# Patient Record
Sex: FEMALE | Race: ASIAN | NOT HISPANIC OR LATINO | URBAN - METROPOLITAN AREA
[De-identification: names, ages, dates, MRNs, and addresses within clinical notes are randomized per-mention and may not be internally consistent; named-entity substitution may affect disease eponyms.]

---

## 2021-02-11 ENCOUNTER — OFFICE VISIT (OUTPATIENT)
Dept: URBAN - METROPOLITAN AREA CLINIC 78 | Facility: CLINIC | Age: 51
End: 2021-02-11
Payer: COMMERCIAL

## 2021-02-11 VITALS
TEMPERATURE: 97.4 F | SYSTOLIC BLOOD PRESSURE: 116 MMHG | WEIGHT: 137 LBS | HEART RATE: 62 BPM | HEIGHT: 63 IN | DIASTOLIC BLOOD PRESSURE: 78 MMHG | RESPIRATION RATE: 16 BRPM | BODY MASS INDEX: 24.27 KG/M2

## 2021-02-11 DIAGNOSIS — R10.11 RUQ PAIN: ICD-10-CM

## 2021-02-11 DIAGNOSIS — R52 TENDERNESS DETERMINED BY EXAMINATION: ICD-10-CM

## 2021-02-11 DIAGNOSIS — R12 HEARTBURN: ICD-10-CM

## 2021-02-11 PROCEDURE — 99214 OFFICE O/P EST MOD 30 MIN: CPT | Performed by: INTERNAL MEDICINE

## 2021-02-11 PROCEDURE — G8483 FLU IMM NO ADMIN DOC REA: HCPCS | Performed by: INTERNAL MEDICINE

## 2021-02-11 RX ORDER — PANTOPRAZOLE SODIUM 40 MG/1
1 TABLET TABLET, DELAYED RELEASE ORAL ONCE A DAY
Qty: 90 | Refills: 0 | OUTPATIENT
Start: 2021-02-11

## 2021-02-11 NOTE — PHYSICAL EXAM GASTROINTESTINAL
Abdomen , soft, TTP in RUQ , TTP in epigastric area nondistended , no guarding or rigidity , no masses palpable , normal bowel sounds , Liver and Spleen , no hepatomegaly present ,liver nontender , spleen not palpable

## 2021-02-11 NOTE — HPI-TODAY'S VISIT:
The patient elicits having abdominal pain. intermmittent in RUQ and epigastric  Pain is spasm last 2 mts , no obvious food triggers  Duration of symptoms: 2 months  BM are daily  but roberto  Fiber intake ..1 capsule daily  with dinner at 6:30  Associated symptoms: Denies nausea ,vomiting  Severity/ Pain scale: Not severe  What relieves the pain: Nothing  Last colonoscopy was done in 2019 , diverticulosis otherwise normal    GERD.. couple of years  Has seen ENT for Halitosis ( sinus CT scan was fine , Tonsillectomy done )  Heartburn , sour mash  She has to sleep with three pillow at night x 1 year   Glass of wine  at 8 30  Bedtime is 10 pm  Colonoscopy performed by me in 2019 , single ascending colon diverticulum

## 2021-02-13 LAB
A/G RATIO: 1.2
ALBUMIN: 4.7
ALKALINE PHOSPHATASE: 90
ALT (SGPT): 32
AST (SGOT): 30
BILIRUBIN, TOTAL: 0.2
BUN/CREATININE RATIO: 17
BUN: 12
CALCIUM: 9.8
CARBON DIOXIDE, TOTAL: 28
CHLORIDE: 103
CREATININE: 0.7
EGFR IF AFRICN AM: 115
EGFR IF NONAFRICN AM: 100
GLOBULIN, TOTAL: 3.8
GLUCOSE: 100
LIPASE: 51
POTASSIUM: 4.6
PROTEIN, TOTAL: 8.5
SODIUM: 144

## 2021-02-17 ENCOUNTER — OFFICE VISIT (OUTPATIENT)
Dept: URBAN - METROPOLITAN AREA CLINIC 77 | Facility: CLINIC | Age: 51
End: 2021-02-17
Payer: COMMERCIAL

## 2021-02-17 DIAGNOSIS — R10.11 RUQ PAIN: ICD-10-CM

## 2021-02-17 PROCEDURE — 76700 US EXAM ABDOM COMPLETE: CPT | Performed by: INTERNAL MEDICINE

## 2021-02-17 RX ORDER — PANTOPRAZOLE SODIUM 40 MG/1
1 TABLET TABLET, DELAYED RELEASE ORAL ONCE A DAY
Qty: 90 | Refills: 0 | Status: ACTIVE | COMMUNITY
Start: 2021-02-11

## 2021-03-09 ENCOUNTER — CLAIMS CREATED FROM THE CLAIM WINDOW (OUTPATIENT)
Dept: URBAN - METROPOLITAN AREA CLINIC 4 | Facility: CLINIC | Age: 51
End: 2021-03-09
Payer: COMMERCIAL

## 2021-03-09 ENCOUNTER — OFFICE VISIT (OUTPATIENT)
Dept: URBAN - METROPOLITAN AREA SURGERY CENTER 15 | Facility: SURGERY CENTER | Age: 51
End: 2021-03-09
Payer: COMMERCIAL

## 2021-03-09 DIAGNOSIS — K21.00 GASTRO-ESOPHAGEAL REFLUX DISEASE WITH ESOPHAGITIS, WITHOUT BLEEDING: ICD-10-CM

## 2021-03-09 DIAGNOSIS — R10.11 ABDOMINAL BURNING SENSATION IN RIGHT UPPER QUADRANT: ICD-10-CM

## 2021-03-09 DIAGNOSIS — K31.89 DILATION OF STOMACH: ICD-10-CM

## 2021-03-09 DIAGNOSIS — K21.9 ACID REFLUX: ICD-10-CM

## 2021-03-09 PROCEDURE — G8907 PT DOC NO EVENTS ON DISCHARG: HCPCS | Performed by: INTERNAL MEDICINE

## 2021-03-09 PROCEDURE — 43239 EGD BIOPSY SINGLE/MULTIPLE: CPT | Performed by: INTERNAL MEDICINE

## 2021-03-09 PROCEDURE — 88305 TISSUE EXAM BY PATHOLOGIST: CPT | Performed by: PATHOLOGY

## 2021-03-09 PROCEDURE — 88312 SPECIAL STAINS GROUP 1: CPT | Performed by: PATHOLOGY

## 2021-03-09 RX ORDER — PANTOPRAZOLE SODIUM 40 MG/1
1 TABLET TABLET, DELAYED RELEASE ORAL ONCE A DAY
Qty: 90 | Refills: 0 | Status: ACTIVE | COMMUNITY
Start: 2021-02-11

## 2021-03-18 ENCOUNTER — WEB ENCOUNTER (OUTPATIENT)
Dept: URBAN - METROPOLITAN AREA CLINIC 78 | Facility: CLINIC | Age: 51
End: 2021-03-18

## 2021-03-18 RX ORDER — PANTOPRAZOLE SODIUM 40 MG/1
1 TABLET TABLET, DELAYED RELEASE ORAL ONCE A DAY
Qty: 90 | Refills: 2
Start: 2021-02-11

## 2021-05-20 ENCOUNTER — OFFICE VISIT (OUTPATIENT)
Dept: URBAN - METROPOLITAN AREA CLINIC 78 | Facility: CLINIC | Age: 51
End: 2021-05-20
Payer: COMMERCIAL

## 2021-05-20 ENCOUNTER — DASHBOARD ENCOUNTERS (OUTPATIENT)
Age: 51
End: 2021-05-20

## 2021-05-20 VITALS
DIASTOLIC BLOOD PRESSURE: 85 MMHG | HEART RATE: 64 BPM | TEMPERATURE: 97.9 F | SYSTOLIC BLOOD PRESSURE: 126 MMHG | RESPIRATION RATE: 16 BRPM | HEIGHT: 63 IN | BODY MASS INDEX: 24.49 KG/M2 | WEIGHT: 138.2 LBS

## 2021-05-20 DIAGNOSIS — R12 HEARTBURN: ICD-10-CM

## 2021-05-20 DIAGNOSIS — R05 COUGH: ICD-10-CM

## 2021-05-20 DIAGNOSIS — R10.11 RUQ PAIN: ICD-10-CM

## 2021-05-20 DIAGNOSIS — K22.0 HYPERTENSIVE LOWER ESOPHAGEAL SPHINCTER: ICD-10-CM

## 2021-05-20 PROBLEM — 235630008: Status: ACTIVE | Noted: 2021-05-20

## 2021-05-20 PROCEDURE — 99213 OFFICE O/P EST LOW 20 MIN: CPT | Performed by: INTERNAL MEDICINE

## 2021-05-20 RX ORDER — PANTOPRAZOLE SODIUM 40 MG/1
1 TABLET TABLET, DELAYED RELEASE ORAL ONCE A DAY
Qty: 90 | Refills: 0 | OUTPATIENT

## 2021-05-20 RX ORDER — PANTOPRAZOLE SODIUM 40 MG/1
1 TABLET TABLET, DELAYED RELEASE ORAL ONCE A DAY
Qty: 90 | Refills: 2 | Status: ACTIVE | COMMUNITY
Start: 2021-02-11

## 2021-05-20 NOTE — HPI-TODAY'S VISIT:
Patient is here in a follow up after the recent EGD The findings of the procedure and the pathology were discussed with the patient. All questions were answered to the patient's satisfaction. EGD suggested Hypertensive LES She still reports episode of RUQ and epigastric spasm which can last 2 mts  Has a lot of back pain, trigger points  Patient has seen ENT Dr Clark rodriguez   She wakes up at 1 am or 2 am with dry cough despite sleeping up on three pillows  RUQ US 2/21 : Normal study   PREVIOUS ENCOUNTER: The patient elicits having abdominal pain. Intermmittent in RUQ and epigastric  Pain is spasm last 2 mts, no obvious food triggers  Duration of symptoms: 2 months  BM are daily  but roberto  Fiber intake .1 capsule daily  with dinner at 6:30  Associated symptoms: Denies nausea,vomiting  Severity/ Pain scale: Not severe  What relieves the pain: Nothing  Last colonoscopy was done in 2019, diverticulosis otherwise normal    GERD. Couple of years  Has seen ENT for Halitosis (sinus CT scan was fine, Tonsillectomy done)  Heartburn, sour mash  She has to sleep with three pillows at night x 1 year   Glass of wine  at 8 30  Bedtime is 10 pm  Colonoscopy performed by me in 2019, single ascending colon diverticulum

## 2022-01-05 ENCOUNTER — P2P PATIENT RECORD (OUTPATIENT)
Age: 52
End: 2022-01-05

## 2025-03-03 ENCOUNTER — APPOINTMENT (OUTPATIENT)
Age: 55
Setting detail: DERMATOLOGY
End: 2025-03-03

## 2025-03-03 DIAGNOSIS — D16 BENIGN NEOPLASM OF BONE AND ARTICULAR CARTILAGE: ICD-10-CM

## 2025-03-03 DIAGNOSIS — L81.4 OTHER MELANIN HYPERPIGMENTATION: ICD-10-CM

## 2025-03-03 PROBLEM — D16.9 BENIGN NEOPLASM OF BONE AND ARTICULAR CARTILAGE, UNSPECIFIED: Status: ACTIVE | Noted: 2025-03-03

## 2025-03-03 PROCEDURE — 99203 OFFICE O/P NEW LOW 30 MIN: CPT

## 2025-03-03 PROCEDURE — ? PRESCRIPTION MEDICATION MANAGEMENT

## 2025-03-03 PROCEDURE — ? REFERRAL DETAILS

## 2025-03-03 PROCEDURE — ? COUNSELING

## 2025-03-03 ASSESSMENT — LOCATION ZONE DERM
LOCATION ZONE: FACE
LOCATION ZONE: ARM

## 2025-03-03 ASSESSMENT — LOCATION DETAILED DESCRIPTION DERM
LOCATION DETAILED: LEFT MEDIAL FOREHEAD
LOCATION DETAILED: RIGHT DISTAL DORSAL FOREARM
LOCATION DETAILED: LEFT PROXIMAL DORSAL FOREARM

## 2025-03-03 ASSESSMENT — LOCATION SIMPLE DESCRIPTION DERM
LOCATION SIMPLE: RIGHT FOREARM
LOCATION SIMPLE: LEFT FOREHEAD
LOCATION SIMPLE: LEFT FOREARM

## 2025-03-03 NOTE — PROCEDURE: MIPS QUALITY
Quality 431: Preventive Care And Screening: Unhealthy Alcohol Use - Screening: Patient not identified as an unhealthy alcohol user when screened for unhealthy alcohol use using a systematic screening method
Quality 226: Preventive Care And Screening: Tobacco Use: Screening And Cessation Intervention: Patient screened for tobacco use and is an ex/non-smoker
Quality 47: Advance Care Plan: Advance Care Planning discussed and documented; advance care plan or surrogate decision maker documented in the medical record.
Detail Level: Detailed
Name And Contact Information For Health Care Proxy: Kiel Pimentel [phone] spouse

## 2025-03-03 NOTE — PROCEDURE: PRESCRIPTION MEDICATION MANAGEMENT
Detail Level: Zone
Plan: -Onset 10 years\\n-Patient reports growing in size and appearance \\n-Discussed dx in great detail with patient\\n-Patient reports irritation\\n\\nPLAN\\n-Discussed future treatment via excision with  with the [facility], information given today
Render In Strict Bullet Format?: No

## 2025-03-03 NOTE — PROCEDURE: REFERRAL DETAILS
Referring Provider: Dr. Pisano with the [facility]
Detail Level: Detailed
Treatment Plan: Excision